# Patient Record
Sex: MALE | Race: WHITE | Employment: FULL TIME | URBAN - METROPOLITAN AREA
[De-identification: names, ages, dates, MRNs, and addresses within clinical notes are randomized per-mention and may not be internally consistent; named-entity substitution may affect disease eponyms.]

---

## 2017-03-15 RX ORDER — LISINOPRIL 20 MG/1
TABLET ORAL
Qty: 30 TAB | Refills: 0 | Status: SHIPPED | OUTPATIENT
Start: 2017-03-15

## 2017-06-15 RX ORDER — LISINOPRIL 20 MG/1
TABLET ORAL
Qty: 30 TAB | Refills: 0 | OUTPATIENT
Start: 2017-06-15

## 2022-08-01 ENCOUNTER — HOSPITAL ENCOUNTER (EMERGENCY)
Age: 52
Discharge: HOME | End: 2022-08-01
Attending: STUDENT IN AN ORGANIZED HEALTH CARE EDUCATION/TRAINING PROGRAM | Admitting: STUDENT IN AN ORGANIZED HEALTH CARE EDUCATION/TRAINING PROGRAM
Payer: COMMERCIAL

## 2022-08-01 VITALS
OXYGEN SATURATION: 97 % | DIASTOLIC BLOOD PRESSURE: 95 MMHG | WEIGHT: 258.6 LBS | BODY MASS INDEX: 35.03 KG/M2 | TEMPERATURE: 98 F | HEART RATE: 93 BPM | HEIGHT: 72 IN | SYSTOLIC BLOOD PRESSURE: 178 MMHG | RESPIRATION RATE: 20 BRPM

## 2022-08-01 DIAGNOSIS — T78.40XA ALLERGIC REACTION, INITIAL ENCOUNTER: Primary | ICD-10-CM

## 2022-08-01 PROCEDURE — 74011250637 HC RX REV CODE- 250/637: Performed by: STUDENT IN AN ORGANIZED HEALTH CARE EDUCATION/TRAINING PROGRAM

## 2022-08-01 PROCEDURE — 99283 EMERGENCY DEPT VISIT LOW MDM: CPT | Performed by: STUDENT IN AN ORGANIZED HEALTH CARE EDUCATION/TRAINING PROGRAM

## 2022-08-01 PROCEDURE — 74011636637 HC RX REV CODE- 636/637: Performed by: STUDENT IN AN ORGANIZED HEALTH CARE EDUCATION/TRAINING PROGRAM

## 2022-08-01 RX ORDER — PREDNISONE 20 MG/1
60 TABLET ORAL DAILY
Qty: 15 TABLET | Refills: 0 | Status: SHIPPED | OUTPATIENT
Start: 2022-08-01 | End: 2022-08-06

## 2022-08-01 RX ORDER — EPINEPHRINE 0.3 MG/.3ML
0.3 INJECTION SUBCUTANEOUS
Qty: 1 EACH | Refills: 0 | Status: SHIPPED | OUTPATIENT
Start: 2022-08-01 | End: 2022-08-01

## 2022-08-01 RX ORDER — HYDROXYZINE 25 MG/1
25 TABLET, FILM COATED ORAL
Status: COMPLETED | OUTPATIENT
Start: 2022-08-01 | End: 2022-08-01

## 2022-08-01 RX ORDER — FAMOTIDINE 20 MG/1
20 TABLET, FILM COATED ORAL
Qty: 7 TABLET | Refills: 0 | Status: SHIPPED | OUTPATIENT
Start: 2022-08-01 | End: 2022-08-08

## 2022-08-01 RX ORDER — HYDROXYZINE PAMOATE 25 MG/1
25 CAPSULE ORAL
Qty: 15 CAPSULE | Refills: 0 | Status: SHIPPED | OUTPATIENT
Start: 2022-08-01 | End: 2022-08-06

## 2022-08-01 RX ORDER — FAMOTIDINE 20 MG/1
20 TABLET, FILM COATED ORAL
Status: COMPLETED | OUTPATIENT
Start: 2022-08-01 | End: 2022-08-01

## 2022-08-01 RX ORDER — PREDNISONE 20 MG/1
60 TABLET ORAL
Status: COMPLETED | OUTPATIENT
Start: 2022-08-01 | End: 2022-08-01

## 2022-08-01 RX ADMIN — PREDNISONE 60 MG: 20 TABLET ORAL at 19:52

## 2022-08-01 RX ADMIN — FAMOTIDINE 20 MG: 20 TABLET, FILM COATED ORAL at 19:52

## 2022-08-01 RX ADMIN — HYDROXYZINE HYDROCHLORIDE 25 MG: 25 TABLET, FILM COATED ORAL at 21:59

## 2022-08-01 NOTE — ED NOTES
Pt has been medicated and placed in the quiet room. Informed his pt and his wife if symptoms were to worsen to advise a medical staff.

## 2022-08-01 NOTE — ED PROVIDER NOTES
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 Patient reports he was stung by 6-7 yellow jackets while mowing the lawn earlier this afternoon. Reports stings to lower legs, wrist, hand and neck. Patient reports he has developed extreme itching, eye redness and lip swelling. 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 States he has an epi pen but he did not use it because it is over a year . He denies any throat closing sensation, difficulty breathing, shortness of breath, trouble speaking, wheezing. He took 2 benadryl PTA. Past Medical History:   Diagnosis Date    Essential hypertension, benign 10/31/2013    Family history of premature CAD-mat GF MI @ 64 2013    H/O prostate biopsy     History of bee sting allergy 2012    Low serum testosterone level 3/31/2013    Microscopic hematuria 2012    Other and unspecified hyperlipidemia- LDL particle goal<1300 2012    Varicose vein-saphenous vein closure bilat 2010    Vitamin D deficiency 2013       Past Surgical History:   Procedure Laterality Date    HC INC/FIDEL PILONIDAL CYST SIMPLE      HX WISDOM TEETH EXTRACTION      VASCULAR SURGERY PROCEDURE UNLIST      veri. vein ligation ,          Family History:   Problem Relation Age of Onset    Hypertension Father     Heart Disease Maternal Grandfather     Hypertension Maternal Grandfather     Cancer Paternal Grandmother        Social History     Socioeconomic History    Marital status:      Spouse name: Not on file    Number of children: Not on file    Years of education: Not on file    Highest education level: Not on file   Occupational History    Not on file   Tobacco Use    Smoking status: Never    Smokeless tobacco: Current     Types: Chew   Substance and Sexual Activity    Alcohol use:  Yes     Alcohol/week: 0.8 standard drinks     Types: 1 Cans of beer per week    Drug use: No    Sexual activity: Yes     Partners: Female   Other Topics Concern    Not on file   Social History Narrative    Not on file     Social Determinants of Health     Financial Resource Strain: Not on file   Food Insecurity: Not on file   Transportation Needs: Not on file   Physical Activity: Not on file   Stress: Not on file   Social Connections: Not on file   Intimate Partner Violence: Not on file   Housing Stability: Not on file         ALLERGIES: Bee sting [sting, bee]    Review of Systems   HENT:  Positive for facial swelling. Negative for congestion, dental problem, drooling, trouble swallowing and voice change. Eyes:  Positive for redness. Respiratory:  Negative for cough, choking, chest tightness, shortness of breath, wheezing and stridor. Cardiovascular:  Negative for chest pain and palpitations. Musculoskeletal:  Negative for back pain, neck pain and neck stiffness. Skin:  Positive for rash. Negative for color change and wound. Allergic/Immunologic: Positive for environmental allergies. Vitals:    08/01/22 1938   BP: (!) 178/95   Pulse: 93   Resp: 20   Temp: 98 °F (36.7 °C)   SpO2: 97%   Weight: 117.3 kg (258 lb 9.6 oz)   Height: 6' (1.829 m)            Physical Exam  Vitals and nursing note reviewed. Constitutional:       General: He is not in acute distress. Appearance: Normal appearance. He is well-developed. He is not toxic-appearing. HENT:      Head: Normocephalic and atraumatic. Nose: Nose normal.      Mouth/Throat:      Mouth: Mucous membranes are moist.      Comments: Minimal periorbital swelling noted. Airway is clear and patent. No edema noted. Eyes:      General: Lids are normal.      Extraocular Movements: Extraocular movements intact. Conjunctiva/sclera:      Right eye: Right conjunctiva is injected. Left eye: Left conjunctiva is injected. Cardiovascular:      Rate and Rhythm: Normal rate and regular rhythm.       Pulses: Normal pulses. Heart sounds: Normal heart sounds, S1 normal and S2 normal.   Pulmonary:      Effort: Pulmonary effort is normal. No accessory muscle usage. Breath sounds: Normal breath sounds. Abdominal:      Palpations: Abdomen is soft. Musculoskeletal:         General: Normal range of motion. Cervical back: Normal range of motion and neck supple. Skin:     General: Skin is warm and dry. Capillary Refill: Capillary refill takes less than 2 seconds. Comments: Urticaria noted to bilateral upper and lower extremities with excoriations noted. Neurological:      General: No focal deficit present. Mental Status: He is alert and oriented to person, place, and time. Mental status is at baseline. Psychiatric:         Attention and Perception: Attention normal.         Mood and Affect: Mood and affect normal.         Speech: Speech normal.         Behavior: Behavior is cooperative. Thought Content: Thought content normal.         Cognition and Memory: Cognition normal.         Judgment: Judgment normal.        MDM  Number of Diagnoses or Management Options  Allergic reaction, initial encounter  Diagnosis management comments: Patient with allergic reaction due to allergy to bees. Airway is clear and patent. No signs of respiratory distress. VSS. Patient given prednisone and pepcid with improvement of symptoms. He has no complaints of shortness of breath and states he feels improved. Patient given rx for prednisone, pepcid, atarax and epi pen. Return to ER warnings discussed in detail with patient.         Amount and/or Complexity of Data Reviewed  Discuss the patient with other providers: yes (Dr. Shanae Villarreal, ED Attending )           Procedures

## 2022-08-01 NOTE — ED TRIAGE NOTES
Pt presents to the ED with allergic reaction to a bee sting and was stung by about 6-7 yellow jackets at his ankle, neck area, wrist, and hands. States he was mowing the lawn. Has an epipen that was  therefore he did not take. Did take two Benadryls PTA, has hx of anaphylaxis reaction.

## 2022-08-02 NOTE — DISCHARGE INSTRUCTIONS
Take prednisone and zyrtec/allegra/etc in the morning. Take pepcid and benadryl at night. If itching is severe, take atarax instead of benadryl. If you developed difficulty breathing, tongue swelling, shortness of breath or any other concerning symptoms, USE EPI PEN and RETURN TO ER. Please schedule an appointment to be seen by your primary care physician for re-evaluation.